# Patient Record
Sex: MALE | Race: WHITE | NOT HISPANIC OR LATINO | Employment: FULL TIME | URBAN - METROPOLITAN AREA
[De-identification: names, ages, dates, MRNs, and addresses within clinical notes are randomized per-mention and may not be internally consistent; named-entity substitution may affect disease eponyms.]

---

## 2020-08-25 ENCOUNTER — TELEPHONE (OUTPATIENT)
Dept: NEUROSURGERY | Facility: CLINIC | Age: 55
End: 2020-08-25

## 2020-08-25 NOTE — TELEPHONE ENCOUNTER
----- Message from Julita  sent at 8/25/2020 11:41 AM CDT -----  Regarding: appt access  Contact: pt  Type: Needs Medical Advice    Who Called:      Best Call Back Number:     Additional Information: Requesting a call back from Nurse, regarding access to a appt pt has a referral on file ,please call back with advice

## 2020-09-02 ENCOUNTER — OFFICE VISIT (OUTPATIENT)
Dept: NEUROSURGERY | Facility: CLINIC | Age: 55
End: 2020-09-02
Payer: COMMERCIAL

## 2020-09-02 VITALS
HEIGHT: 72 IN | BODY MASS INDEX: 26.28 KG/M2 | SYSTOLIC BLOOD PRESSURE: 139 MMHG | HEART RATE: 99 BPM | DIASTOLIC BLOOD PRESSURE: 83 MMHG | WEIGHT: 194 LBS

## 2020-09-02 DIAGNOSIS — M54.16 LUMBAR RADICULOPATHY, RIGHT: ICD-10-CM

## 2020-09-02 DIAGNOSIS — M47.816 LUMBAR SPONDYLOSIS: Primary | ICD-10-CM

## 2020-09-02 DIAGNOSIS — G95.9 LUMBAR MYELOPATHY: ICD-10-CM

## 2020-09-02 PROCEDURE — 99999 PR PBB SHADOW E&M-EST. PATIENT-LVL III: CPT | Mod: PBBFAC,,, | Performed by: STUDENT IN AN ORGANIZED HEALTH CARE EDUCATION/TRAINING PROGRAM

## 2020-09-02 PROCEDURE — 99244 OFF/OP CNSLTJ NEW/EST MOD 40: CPT | Mod: S$GLB,,, | Performed by: STUDENT IN AN ORGANIZED HEALTH CARE EDUCATION/TRAINING PROGRAM

## 2020-09-02 PROCEDURE — 99244 PR OFFICE CONSULTATION,LEVEL IV: ICD-10-PCS | Mod: S$GLB,,, | Performed by: STUDENT IN AN ORGANIZED HEALTH CARE EDUCATION/TRAINING PROGRAM

## 2020-09-02 PROCEDURE — 99999 PR PBB SHADOW E&M-EST. PATIENT-LVL III: ICD-10-PCS | Mod: PBBFAC,,, | Performed by: STUDENT IN AN ORGANIZED HEALTH CARE EDUCATION/TRAINING PROGRAM

## 2020-09-02 NOTE — PROGRESS NOTES
[facility] Neurosurgery  Clinic Consult     Consult Requested By: CECILIA Mcallister Jr, MD, CECILIA Mcallister Jr., MD        SUBJECTIVE:     Chief Complaint:   Chief Complaint   Patient presents with    Lumbar Spine Pain (L-Spine)     Patient reports low back pain that radiates into the right leg; numbness and tingling present; pain 4/10       History of Present Illness:  Bry Gloria Jr. is a 54 y.o. male who presents with 8 months of right buttock and lower extremity pain  He is stiffness.  Historical back pain flare ups which he is managed conservatively  He has worked with a chiropractor up to 4 days a week  Back pain is not bother now.  It is right he describes as buttock and leg pain  No motor deficit no red flags    S a bother her more when he is resting are attempting sleep  He has tried gabapentin but base and drowsy    Review of patient's allergies indicates:   Allergen Reactions    Crestor [rosuvastatin] Other (See Comments)    Penicillins Rash       Past Medical History:   Diagnosis Date    Hyperlipidemia      Past Surgical History:   Procedure Laterality Date    CHOLECYSTECTOMY      3-0 years ago    COLONOSCOPY      WISDOM TOOTH EXTRACTION       Family History   Problem Relation Age of Onset    Cancer Father      Social History     Tobacco Use    Smoking status: Former Smoker    Smokeless tobacco: Never Used   Substance Use Topics    Alcohol use: Yes    Drug use: Never        Review of Systems:      Constitutional: no fever, chills or night sweats. No changes in weight   Eyes: no diplopia, lid drooping, loss of vision   ENT: no nasal congestion, sore throat, discharge  Respiratory: no cough, shortness of breath, or pain  Cardiovascular: no chest pain or palpitations   Gastrointestinal: no nausea or vomiting  Genitourinary: no hematuria or dysuria   Integument/Breast: no rash or pruritis   Hematologic/Lymphatic: no easy bruising or lymphadenopathy   Musculoskeletal: no arthralgias or myalgias.    Neurological: no seizures or tremors   Behavioral/Psych: no auditory or visual hallucinations   Endocrine: no heat or cold intolerance   All other systems reviewed and are negative.      OBJECTIVE:     Vital Signs (Most Recent):  Pulse: 99 (09/02/20 1342)  BP: 139/83 (09/02/20 1342)    Physical Exam:      General: well developed, well nourished, no distress. .  Mental Status: Awake, Alert, Oriented x 4  Language: No aphasia  Speech: No dysarthria  Head: normocephalic, atraumatic.  Neck: trachea midline, no JVD   Cardiovascular: no LE edema  Pulmonary: normal respirations, no signs of respiratory distress  Abdomen: soft, non-distended  Skin: Skin is warm, dry and intact.    Motor Strength:  No abnormal movements seen.     Strength  Deltoids Triceps Biceps Wrist Extension Wrist Flexion Hand  Interossei     Upper: R 5/5 5/5 5/5 5/5 5/5 5/5 5/5      L 5/5 5/5 5/5 5/5 5/5 5/5 5/5       Iliopsoas Quadriceps Knee  Flexion Tibialis  anterior Gastro- cnemius EHL  Foot Eversion Foot inversion   Lower: R 5/5 5/5 5/5 5/5 5/5 5/5 5/5 5/5 5/5    L 5/5 5/5 5/5 5/5 5/5 5/5 5/5 5/5 5/5     SILT,PP      DTR's: 2 + and symmetric in UE and LE  Gambino: absent  Clonus: absent  Babinski: absent    Gait: normal    Tandem Gait: No difficulty           Able to walk on heels & toes       Pain on Hip ROM: Negative  Straight leg raise: negative bilaterally ,,, pain , tight behind right knee    SI Joint tenderness: Negative bilaterally   BART: Negative bilaterally    Pain tenderness palpation the right gluteal region    Diagnostic Results:  I have independently reviewed the following imaging:  MRI: Reviewed  MRI from 8, 2020,    Mild spondylosis, there is no fracture there is no disc herniation, there is no deformity  He has essentially multilevel mild lumbar spondylosis with degenerative disc disease small disc osteophyte complex  At L4-5 5 1 he has mild moderate facet arthropathy with lateral recess and proximal foraminal stenosis no  obvious mass effect on the neural element      ASSESSMENT/PLAN:     Lumbar spondylosis    Lumbar myelopathy  -     Ambulatory referral/consult to Neurosurgery    Lumbar radiculopathy, right  Comments:  L4-5 SANJEEV diagnostic, therapeutic  Healthy back PT eval/ modalities    will reassess, possible EMG/NCS      Right leg pain, uncertain etiology  He has MRI of the lumbar spine with pretty typical lumbar spondylosis without overt neural impingement correlating with his clinical symptoms  Clinically this would likely correlating with L4-5 or L5-S1   Do taking more likely has a radiculitis/radiculopathy with a significant inflammatory component    Secondary consideration include right gluteal myofascial injury or hip pathology, his right knee pain    Will start further workup of radiculopathy/radiculitis see how he responds and treat conservatively        Ant Simon MD  Neurosurgery

## 2020-09-02 NOTE — LETTER
September 2, 2020      CECILIA Mcallister Jr., MD  [address] [facility]  [address]  [facility] [facility] [address]           [place] - Neurosurgery  [address] [place] [address]  Phone: [phone]  Fax: [phone]          Patient: Bry Gloria Jr.   MR Number: [id number]   YOB: 1965   Date of Visit: 9/2/2020       Dear Dr. CECILIA Mcallister Jr.:    Thank you for referring Bry Gloria to me for evaluation. Attached you will find relevant portions of my assessment and plan of care.    If you have questions, please do not hesitate to call me. I look forward to following Bry Gloria along with you.    Sincerely,    Ant Simon MD    Enclosure  CC:  No Recipients    If you would like to receive this communication electronically, please contact [email or web address] or [phone] to request more information on [employer] Link access.    For providers and/or their staff who would like to refer a patient to [facility], please contact us through our one-stop-shop provider referral line, [facility], at [phone].    If you feel you have received this communication in error or would no longer like to receive these types of communications, please e-mail [email or web address]

## 2020-09-10 ENCOUNTER — TELEPHONE (OUTPATIENT)
Dept: PAIN MEDICINE | Facility: CLINIC | Age: 55
End: 2020-09-10

## 2020-09-11 ENCOUNTER — OFFICE VISIT (OUTPATIENT)
Dept: PAIN MEDICINE | Facility: CLINIC | Age: 55
End: 2020-09-11
Payer: COMMERCIAL

## 2020-09-11 VITALS
HEART RATE: 80 BPM | SYSTOLIC BLOOD PRESSURE: 122 MMHG | HEIGHT: 72 IN | DIASTOLIC BLOOD PRESSURE: 86 MMHG | BODY MASS INDEX: 26.18 KG/M2 | WEIGHT: 193.25 LBS

## 2020-09-11 DIAGNOSIS — M47.816 LUMBAR SPONDYLOSIS: ICD-10-CM

## 2020-09-11 DIAGNOSIS — M54.16 LUMBAR RADICULOPATHY: Primary | ICD-10-CM

## 2020-09-11 PROCEDURE — 99244 PR OFFICE CONSULTATION,LEVEL IV: ICD-10-PCS | Mod: S$GLB,,, | Performed by: PHYSICAL MEDICINE & REHABILITATION

## 2020-09-11 PROCEDURE — 99999 PR PBB SHADOW E&M-EST. PATIENT-LVL IV: CPT | Mod: PBBFAC,,, | Performed by: PHYSICAL MEDICINE & REHABILITATION

## 2020-09-11 PROCEDURE — 99999 PR PBB SHADOW E&M-EST. PATIENT-LVL IV: ICD-10-PCS | Mod: PBBFAC,,, | Performed by: PHYSICAL MEDICINE & REHABILITATION

## 2020-09-11 PROCEDURE — 99244 OFF/OP CNSLTJ NEW/EST MOD 40: CPT | Mod: S$GLB,,, | Performed by: PHYSICAL MEDICINE & REHABILITATION

## 2020-09-11 NOTE — LETTER
September 11, 2020      Ant Simon MD  [address] [facility] [address] [place] [address]           [facility] Interventional Pain Medicine  [address] [facility] [address]  Phone: [phone]  Fax: [phone]          Patient: Bry Gloria Jr.   MR Number: [id number]   YOB: 1965   Date of Visit: 9/11/2020       Dear Dr. Ant Simon:    Thank you for referring Bry Gloria to me for evaluation. Attached you will find relevant portions of my assessment and plan of care.    If you have questions, please do not hesitate to call me. I look forward to following Bry Gloria along with you.    Sincerely,    Amrit Moreno MD    Enclosure  CC:  No Recipients    If you would like to receive this communication electronically, please contact [email or web address] or [phone] to request more information on [employer] Link access.    For providers and/or their staff who would like to refer a patient to [facility], please contact us through our one-stop-shop provider referral line, [facility], at [phone].    If you feel you have received this communication in error or would no longer like to receive these types of communications, please e-mail [email or web address]

## 2020-09-11 NOTE — H&P (VIEW-ONLY)
New Patient Chronic Pain Note (Initial Visit)    Referring Physician: Ant Simon MD    PCP: CECILIA Mcallister Jr, MD    Chief Complaint:   Chief Complaint   Patient presents with    Hip Pain    Leg Pain        SUBJECTIVE:    Bry Gloria Jr. is a 54 y.o. male who presents to the clinic for the evaluation of lower back pain.  He was referred by the neurosurgery department for further evaluation and management of this pain.  Neurosurgery is requesting a L4-5 SANJEEV for diagnostic and therapeutic purposes.  Of note, patient has past medical history of hyperlipidemia and hypertension.  The pain started 8-9 months ago without any specific injury or trauma and symptoms have been worsening.The pain is located in the lower lumbar area and radiates to the right lower extremity mostly over the lateral hip with extension posteriorly to the calf.  The pain is described as Stabbing, numbness and is rated as 3/10. The pain is rated with a score of  3/10 on the BEST day and a score of 9/10 on the WORST day.  Symptoms interfere with daily activity, sleeping and work. The pain is exacerbated by sitting down, rest, lying down.  The pain is mitigated by light movement. The patient reports spending less than 2 hours per day reclining. The patient reports 5-7 hours of uninterrupted sleep per night.  He owns and operates a plumbing company.    Patient denies night fever/night sweats, urinary incontinence, bowel incontinence, significant weight loss, significant motor weakness and loss of sensations.    Pain Disability Index Review:   No flowsheet data found.    Non-Pharmacologic Treatments:  Physical Therapy/Home Exercise: yes  Ice/Heat:yes  TENS: no  Acupuncture: no  Massage: yes  Chiropractic: yes    Other: no      Pain Medications:  - Opioids: None  - Adjuvant Medications: Medrol Dosepak, Aleve  - Anti-Coagulants: Aspirin     report:  Reviewed and consistent with medication use as prescribed.  No controlled substances recently  prescribed.    Pain Procedures:   Denies      Imaging:   MRI lumbar spine 08/27/2020:      Past Medical History:   Diagnosis Date    Hyperlipidemia      Past Surgical History:   Procedure Laterality Date    CHOLECYSTECTOMY      3-0 years ago    COLONOSCOPY      WISDOM TOOTH EXTRACTION       Social History     Socioeconomic History    Marital status:      Spouse name: Shante    Number of children: 2    Years of education: Not on file    Highest education level: Not on file   Occupational History    Not on file   Social Needs    Financial resource strain: Not on file    Food insecurity     Worry: Not on file     Inability: Not on file    Transportation needs     Medical: Not on file     Non-medical: Not on file   Tobacco Use    Smoking status: Former Smoker    Smokeless tobacco: Never Used   Substance and Sexual Activity    Alcohol use: Yes    Drug use: Never    Sexual activity: Yes   Lifestyle    Physical activity     Days per week: Not on file     Minutes per session: Not on file    Stress: Not at all   Relationships    Social connections     Talks on phone: Not on file     Gets together: Not on file     Attends Orthodoxy service: Not on file     Active member of club or organization: Not on file     Attends meetings of clubs or organizations: Not on file     Relationship status: Not on file   Other Topics Concern    Not on file   Social History Narrative    Not on file     Family History   Problem Relation Age of Onset    Cancer Father        Review of patient's allergies indicates:   Allergen Reactions    Crestor [rosuvastatin] Other (See Comments)    Penicillins Rash       Current Outpatient Medications   Medication Sig    aspirin (ECOTRIN) 81 MG EC tablet Take 81 mg by mouth once daily.    ezetimibe (ZETIA) 10 mg tablet Take 10 mg by mouth once daily.    rosuvastatin (CRESTOR) 5 MG tablet Take 1 tablet (5 mg total) by mouth every other day.    valsartan (DIOVAN) 80 MG tablet  Take 80 mg by mouth once daily.     No current facility-administered medications for this visit.        Review of Systems     GENERAL:  No weight loss, malaise or fevers.  HEENT:   No recent changes in vision or hearing  NECK:  Negative for lumps, no difficulty with swallowing.  RESPIRATORY:  Negative for cough, wheezing or shortness of breath, patient denies any recent URI.  CARDIOVASCULAR:  Negative for chest pain, leg swelling or palpitations.  GI:  Negative for abdominal discomfort, blood in stools or black stools or change in bowel habits.  MUSCULOSKELETAL:  See HPI.  SKIN:  Negative for lesions, rash, and itching.  PSYCH:  No mood disorder or recent psychosocial stressors.  Patients sleep is not disturbed secondary to pain.  HEMATOLOGY/LYMPHOLOGY:  Negative for prolonged bleeding, bruising easily or swollen nodes.  Patient is currently taking anti-coagulants - ASA  NEURO:   No history of headaches, syncope, paralysis, seizures or tremors.  All other reviewed and negative other than HPI.    OBJECTIVE:    /86   Pulse 80   Ht 6' (1.829 m)   Wt 87.6 kg (193 lb 3.7 oz)   BMI 26.21 kg/m²         Physical Exam    GENERAL: Well appearing, in no acute distress, alert and oriented x3.  PSYCH:  Mood and affect appropriate.  SKIN: Skin color, texture, turgor normal, no rashes or lesions.  HEAD/FACE:  Normocephalic, atraumatic. Cranial nerves grossly intact.  CV: RRR with palpation of the radial artery.  PULM: No evidence of respiratory difficulty, symmetric chest rise.  GI:  Soft and non-tender.  BACK: Straight leg raising in the sitting and supine positions is positive to radicular pain on the right.  Minimal pain to palpation over the facet joints of the lumbar spine and lumbar paraspinals.  Fair range of motion without pain reproduction.  EXTREMITIES: Peripheral joint ROM is full and pain free without obvious instability or laxity in all four extremities. No deformities, edema, or skin discoloration. Good  capillary refill.  MUSCULOSKELETAL:  Hip and knee provocative maneuvers are negative.  There is no pain with palpation over the sacroiliac joints bilaterally.  FABERs test is negative.  FADIRs test is negative.   Bilateral upper and lower extremity strength is normal and symmetric.  No atrophy or tone abnormalities are noted.  NEURO: Bilateral upper and lower extremity coordination and muscle stretch reflexes are physiologic and symmetric.  Plantar response are downgoing. No clonus.  No loss of sensation is noted.  GAIT: normal.      LABS:  Lab Results   Component Value Date    WBC CANCELED 08/25/2020    HGB CANCELED 08/25/2020    HCT CANCELED 08/25/2020    PLT CANCELED 08/25/2020       CMP  Sodium   Date Value Ref Range Status   08/25/2020 141 134 - 144 mmol/L Final     Potassium   Date Value Ref Range Status   08/25/2020 4.4 3.5 - 5.2 mmol/L Final     Chloride   Date Value Ref Range Status   08/25/2020 101 96 - 106 mmol/L Final     CO2   Date Value Ref Range Status   08/25/2020 23 20 - 29 mmol/L Final     Glucose   Date Value Ref Range Status   08/25/2020 82 65 - 99 mg/dL Final     BUN, Bld   Date Value Ref Range Status   08/25/2020 17 6 - 24 mg/dL Final     Creatinine   Date Value Ref Range Status   08/25/2020 1.11 0.76 - 1.27 mg/dL Final     Calcium   Date Value Ref Range Status   08/25/2020 9.3 8.7 - 10.2 mg/dL Final     Albumin   Date Value Ref Range Status   08/25/2020 4.4 3.8 - 4.9 g/dL Final     Total Bilirubin   Date Value Ref Range Status   08/25/2020 0.7 0.0 - 1.2 mg/dL Final     AST   Date Value Ref Range Status   08/25/2020 22 0 - 40 IU/L Final     ALT   Date Value Ref Range Status   08/25/2020 15 0 - 44 IU/L Final     eGFR if non    Date Value Ref Range Status   08/25/2020 74 >59 mL/min/1.73 Final       No results found for: LABA1C, HGBA1C          ASSESSMENT: 54 y.o. year old male with lower back and right leg pain, consistent with     1. Lumbar radiculopathy  IR Epidural  Transforaminal Inj 1st Vert Lumbar Uni    IR Epidural Transfor Inj Ea Add Lumb Uni    Case Request-RAD/Other Procedure Area: right L4-5+ L5-S1 TF SANJEEV with local   2. Lumbar spondylosis  Ambulatory referral/consult to Pain Clinic         PLAN:   - Interventions: Scheduled for right-sided L4-5 and L5-S1 transforaminal epidural steroid injection for diagnostic and therapeutic purposes.. Explained the risks and benefits of the procedure in detail with the patient today in clinic along with alternative treatment options, and the patient elected to pursue the intervention at this time.      - Anticoagulation use: yes aspirin    - Medications: I have stressed the importance of physical activity and a home exercise plan to help with pain and improve health. and Patient can continue with medications for now since they are providing benefits, using them appropriately, and without side effects.     - Therapy:  Advised patient continue with activities and exercises as tolerated    - Labs:  Reviewed    - Imaging: Reviewed available imaging with patient and answered any questions they had regarding study.    - Consults/Referrals:  None at this time    - Records:  Reviewed/Obtain old records from outside physicians and imaging    - Follow up visit: return to clinic 4 weeks post procedure    - Counseled patient regarding the importance of activity modification and physical therapy    - This condition does not require this patient to take time off of work, and the primary goal of our Pain Management services is to improve the patient's functional capacity.    - Patient Questions: Answered all of the patient's questions regarding diagnosis, therapy, and treatment        The above plan and management options were discussed at length with patient. Patient is in agreement with the above and verbalized understanding.    I discussed the goals of interventional chronic pain management with the patient on today's visit.  I explained the  utility of injections for diagnostic and therapeutic purposes.  We discussed a multimodal approach to pain including treating the patient's given worst pain at any given time.  We will use a systematic approach to addressing pain.  We will also adopt a multimodal approach that includes injections, adjuvant medications, physical therapy, at times psychiatry.  There may be a limited role for opioid use intermittently in the treatment of pain, more particularly for acute pain although no one approach can be used as a sole treatment modality.    I emphasized the importance of regular exercise, core strengthening and stretching, diet and weight loss as a cornerstone of long-term pain management.      Amrit Moreno MD  Interventional Pain Management  [facility]    Disclaimer:  This note was prepared using voice recognition system and is likely to have sound alike errors that may have been overlooked even after proof reading.  Please call me with any questions

## 2020-09-11 NOTE — PROGRESS NOTES
New Patient Chronic Pain Note (Initial Visit)    Referring Physician: Ant Simon MD    PCP: CECILIA Mcallister Jr, MD    Chief Complaint:   Chief Complaint   Patient presents with    Hip Pain    Leg Pain        SUBJECTIVE:    Bry Gloria Jr. is a 54 y.o. male who presents to the clinic for the evaluation of lower back pain.  He was referred by the neurosurgery department for further evaluation and management of this pain.  Neurosurgery is requesting a L4-5 SANJEEV for diagnostic and therapeutic purposes.  Of note, patient has past medical history of hyperlipidemia and hypertension.  The pain started 8-9 months ago without any specific injury or trauma and symptoms have been worsening.The pain is located in the lower lumbar area and radiates to the right lower extremity mostly over the lateral hip with extension posteriorly to the calf.  The pain is described as Stabbing, numbness and is rated as 3/10. The pain is rated with a score of  3/10 on the BEST day and a score of 9/10 on the WORST day.  Symptoms interfere with daily activity, sleeping and work. The pain is exacerbated by sitting down, rest, lying down.  The pain is mitigated by light movement. The patient reports spending less than 2 hours per day reclining. The patient reports 5-7 hours of uninterrupted sleep per night.  He owns and operates a plumbing company.    Patient denies night fever/night sweats, urinary incontinence, bowel incontinence, significant weight loss, significant motor weakness and loss of sensations.    Pain Disability Index Review:   No flowsheet data found.    Non-Pharmacologic Treatments:  Physical Therapy/Home Exercise: yes  Ice/Heat:yes  TENS: no  Acupuncture: no  Massage: yes  Chiropractic: yes    Other: no      Pain Medications:  - Opioids: None  - Adjuvant Medications: Medrol Dosepak, Aleve  - Anti-Coagulants: Aspirin     report:  Reviewed and consistent with medication use as prescribed.  No controlled substances recently  prescribed.    Pain Procedures:   Denies      Imaging:   MRI lumbar spine 08/27/2020:      Past Medical History:   Diagnosis Date    Hyperlipidemia      Past Surgical History:   Procedure Laterality Date    CHOLECYSTECTOMY      3-0 years ago    COLONOSCOPY      WISDOM TOOTH EXTRACTION       Social History     Socioeconomic History    Marital status:      Spouse name: Shante    Number of children: 2    Years of education: Not on file    Highest education level: Not on file   Occupational History    Not on file   Social Needs    Financial resource strain: Not on file    Food insecurity     Worry: Not on file     Inability: Not on file    Transportation needs     Medical: Not on file     Non-medical: Not on file   Tobacco Use    Smoking status: Former Smoker    Smokeless tobacco: Never Used   Substance and Sexual Activity    Alcohol use: Yes    Drug use: Never    Sexual activity: Yes   Lifestyle    Physical activity     Days per week: Not on file     Minutes per session: Not on file    Stress: Not at all   Relationships    Social connections     Talks on phone: Not on file     Gets together: Not on file     Attends Restorationism service: Not on file     Active member of club or organization: Not on file     Attends meetings of clubs or organizations: Not on file     Relationship status: Not on file   Other Topics Concern    Not on file   Social History Narrative    Not on file     Family History   Problem Relation Age of Onset    Cancer Father        Review of patient's allergies indicates:   Allergen Reactions    Crestor [rosuvastatin] Other (See Comments)    Penicillins Rash       Current Outpatient Medications   Medication Sig    aspirin (ECOTRIN) 81 MG EC tablet Take 81 mg by mouth once daily.    ezetimibe (ZETIA) 10 mg tablet Take 10 mg by mouth once daily.    rosuvastatin (CRESTOR) 5 MG tablet Take 1 tablet (5 mg total) by mouth every other day.    valsartan (DIOVAN) 80 MG tablet  Take 80 mg by mouth once daily.     No current facility-administered medications for this visit.        Review of Systems     GENERAL:  No weight loss, malaise or fevers.  HEENT:   No recent changes in vision or hearing  NECK:  Negative for lumps, no difficulty with swallowing.  RESPIRATORY:  Negative for cough, wheezing or shortness of breath, patient denies any recent URI.  CARDIOVASCULAR:  Negative for chest pain, leg swelling or palpitations.  GI:  Negative for abdominal discomfort, blood in stools or black stools or change in bowel habits.  MUSCULOSKELETAL:  See HPI.  SKIN:  Negative for lesions, rash, and itching.  PSYCH:  No mood disorder or recent psychosocial stressors.  Patients sleep is not disturbed secondary to pain.  HEMATOLOGY/LYMPHOLOGY:  Negative for prolonged bleeding, bruising easily or swollen nodes.  Patient is currently taking anti-coagulants - ASA  NEURO:   No history of headaches, syncope, paralysis, seizures or tremors.  All other reviewed and negative other than HPI.    OBJECTIVE:    /86   Pulse 80   Ht 6' (1.829 m)   Wt 87.6 kg (193 lb 3.7 oz)   BMI 26.21 kg/m²         Physical Exam    GENERAL: Well appearing, in no acute distress, alert and oriented x3.  PSYCH:  Mood and affect appropriate.  SKIN: Skin color, texture, turgor normal, no rashes or lesions.  HEAD/FACE:  Normocephalic, atraumatic. Cranial nerves grossly intact.  CV: RRR with palpation of the radial artery.  PULM: No evidence of respiratory difficulty, symmetric chest rise.  GI:  Soft and non-tender.  BACK: Straight leg raising in the sitting and supine positions is positive to radicular pain on the right.  Minimal pain to palpation over the facet joints of the lumbar spine and lumbar paraspinals.  Fair range of motion without pain reproduction.  EXTREMITIES: Peripheral joint ROM is full and pain free without obvious instability or laxity in all four extremities. No deformities, edema, or skin discoloration. Good  capillary refill.  MUSCULOSKELETAL:  Hip and knee provocative maneuvers are negative.  There is no pain with palpation over the sacroiliac joints bilaterally.  FABERs test is negative.  FADIRs test is negative.   Bilateral upper and lower extremity strength is normal and symmetric.  No atrophy or tone abnormalities are noted.  NEURO: Bilateral upper and lower extremity coordination and muscle stretch reflexes are physiologic and symmetric.  Plantar response are downgoing. No clonus.  No loss of sensation is noted.  GAIT: normal.      LABS:  Lab Results   Component Value Date    WBC CANCELED 08/25/2020    HGB CANCELED 08/25/2020    HCT CANCELED 08/25/2020    PLT CANCELED 08/25/2020       CMP  Sodium   Date Value Ref Range Status   08/25/2020 141 134 - 144 mmol/L Final     Potassium   Date Value Ref Range Status   08/25/2020 4.4 3.5 - 5.2 mmol/L Final     Chloride   Date Value Ref Range Status   08/25/2020 101 96 - 106 mmol/L Final     CO2   Date Value Ref Range Status   08/25/2020 23 20 - 29 mmol/L Final     Glucose   Date Value Ref Range Status   08/25/2020 82 65 - 99 mg/dL Final     BUN, Bld   Date Value Ref Range Status   08/25/2020 17 6 - 24 mg/dL Final     Creatinine   Date Value Ref Range Status   08/25/2020 1.11 0.76 - 1.27 mg/dL Final     Calcium   Date Value Ref Range Status   08/25/2020 9.3 8.7 - 10.2 mg/dL Final     Albumin   Date Value Ref Range Status   08/25/2020 4.4 3.8 - 4.9 g/dL Final     Total Bilirubin   Date Value Ref Range Status   08/25/2020 0.7 0.0 - 1.2 mg/dL Final     AST   Date Value Ref Range Status   08/25/2020 22 0 - 40 IU/L Final     ALT   Date Value Ref Range Status   08/25/2020 15 0 - 44 IU/L Final     eGFR if non    Date Value Ref Range Status   08/25/2020 74 >59 mL/min/1.73 Final       No results found for: LABA1C, HGBA1C          ASSESSMENT: 54 y.o. year old male with lower back and right leg pain, consistent with     1. Lumbar radiculopathy  IR Epidural  Transforaminal Inj 1st Vert Lumbar Uni    IR Epidural Transfor Inj Ea Add Lumb Uni    Case Request-RAD/Other Procedure Area: right L4-5+ L5-S1 TF SANJEEV with local   2. Lumbar spondylosis  Ambulatory referral/consult to Pain Clinic         PLAN:   - Interventions: Scheduled for right-sided L4-5 and L5-S1 transforaminal epidural steroid injection for diagnostic and therapeutic purposes.. Explained the risks and benefits of the procedure in detail with the patient today in clinic along with alternative treatment options, and the patient elected to pursue the intervention at this time.      - Anticoagulation use: yes aspirin    - Medications: I have stressed the importance of physical activity and a home exercise plan to help with pain and improve health. and Patient can continue with medications for now since they are providing benefits, using them appropriately, and without side effects.     - Therapy:  Advised patient continue with activities and exercises as tolerated    - Labs:  Reviewed    - Imaging: Reviewed available imaging with patient and answered any questions they had regarding study.    - Consults/Referrals:  None at this time    - Records:  Reviewed/Obtain old records from outside physicians and imaging    - Follow up visit: return to clinic 4 weeks post procedure    - Counseled patient regarding the importance of activity modification and physical therapy    - This condition does not require this patient to take time off of work, and the primary goal of our Pain Management services is to improve the patient's functional capacity.    - Patient Questions: Answered all of the patient's questions regarding diagnosis, therapy, and treatment        The above plan and management options were discussed at length with patient. Patient is in agreement with the above and verbalized understanding.    I discussed the goals of interventional chronic pain management with the patient on today's visit.  I explained the  utility of injections for diagnostic and therapeutic purposes.  We discussed a multimodal approach to pain including treating the patient's given worst pain at any given time.  We will use a systematic approach to addressing pain.  We will also adopt a multimodal approach that includes injections, adjuvant medications, physical therapy, at times psychiatry.  There may be a limited role for opioid use intermittently in the treatment of pain, more particularly for acute pain although no one approach can be used as a sole treatment modality.    I emphasized the importance of regular exercise, core strengthening and stretching, diet and weight loss as a cornerstone of long-term pain management.      Amrit Moreno MD  Interventional Pain Management  [facility]    Disclaimer:  This note was prepared using voice recognition system and is likely to have sound alike errors that may have been overlooked even after proof reading.  Please call me with any questions

## 2020-09-16 ENCOUNTER — TELEPHONE (OUTPATIENT)
Dept: PAIN MEDICINE | Facility: CLINIC | Age: 55
End: 2020-09-16

## 2020-09-16 NOTE — TELEPHONE ENCOUNTER
Contacted pt. Appt for procedure scheduled 09/29/2020 with . Went over instructions with pt and pt verbalized understanding.Instructions also mailed to pt.  All questions answered.

## 2020-09-16 NOTE — TELEPHONE ENCOUNTER
Tried to call to inform I am waiting on asa clearance from . No answer. Left   Alexis Boyd, MA [facility]   [facility]

## 2020-09-16 NOTE — TELEPHONE ENCOUNTER
Tried to call to set up procedure . No answer. Left   Farzad Spangler, MA [facility]   [facility]

## 2020-09-16 NOTE — TELEPHONE ENCOUNTER
----- Message from Ana Rodriguez sent at 9/16/2020  8:41 AM CDT -----  Pt stated he has been waiting on a call regarding scheduling an injection appt. Please call back at [phone]

## 2020-09-16 NOTE — TELEPHONE ENCOUNTER
Good morning. There is no medical contraindication to him stopping ASA for the required time interval.   May proceed.

## 2020-09-16 NOTE — TELEPHONE ENCOUNTER
Good morning,  Dr. Moreno would like to perform a Lumbar TF SANJEEV , but patient is on aspirin and would need to hold all asa products 7 days prior, along with any other blood thinners. Please advise.

## 2020-09-18 ENCOUNTER — TELEPHONE (OUTPATIENT)
Dept: PAIN MEDICINE | Facility: CLINIC | Age: 55
End: 2020-09-18

## 2020-09-18 NOTE — PRE-PROCEDURE INSTRUCTIONS
Attempted to PAT patient for procedure on 9/23 with Dr. Moreno . No answer. M with return phone number. No return call at this time.

## 2020-09-18 NOTE — TELEPHONE ENCOUNTER
Moved from 09/29 to 09/24. Pt informed to STOP plavix, aspirin,ibuprofen,motrin,excedrin,aleve,advil,meloxicam,all vitamins, and supplements 5-7 days prior to procedure, so starting tomorrow. Pt understood. All questions answered.   Farzad Spangler MA  [facility]

## 2020-09-21 NOTE — PRE-PROCEDURE INSTRUCTIONS
Spoke with patient regarding procedure scheduled on 9/24    Arrival time 0650    Has patient been sick with fever or on antibiotics within the last 7 days? No    Has patient received a vaccination within the last 7 days? no    Has the patient stopped all medications as directed? ASA on 9/18. Hold vitamins, supplements and other NSAIDS.    Does patient have a pacemaker and or defibrillator? no    Does the patient have a ride to and from procedure and someone reliable to remain with patient? Wife Shante [phone]    Is the patient diabetic? no    Does the patient have sleep apnea? Or use O2 at home? No and no     Is the patient receiving sedation? yes    Is the patient instructed to remain NPO beginning at midnight the night before their procedure? yes    Procedure location confirmed with patient? Yes    Covid- Denies signs/symptoms. Instructed to notify PAT/MD if any changes.

## 2020-09-21 NOTE — PRE-PROCEDURE INSTRUCTIONS
Attempted to PAT patient for procedure on 9/24 with Dr. Moreno . No answer. M with return phone number. No return call at this time.

## 2020-09-24 ENCOUNTER — HOSPITAL ENCOUNTER (OUTPATIENT)
Facility: HOSPITAL | Age: 55
Discharge: HOME | End: 2020-09-24
Attending: PHYSICAL MEDICINE & REHABILITATION | Admitting: PHYSICAL MEDICINE & REHABILITATION
Payer: COMMERCIAL

## 2020-09-24 VITALS
DIASTOLIC BLOOD PRESSURE: 90 MMHG | HEART RATE: 81 BPM | HEIGHT: 72 IN | OXYGEN SATURATION: 98 % | RESPIRATION RATE: 16 BRPM | BODY MASS INDEX: 26.51 KG/M2 | WEIGHT: 195.75 LBS | SYSTOLIC BLOOD PRESSURE: 134 MMHG | TEMPERATURE: 98 F

## 2020-09-24 DIAGNOSIS — M54.16 LUMBAR RADICULOPATHY: Primary | ICD-10-CM

## 2020-09-24 PROCEDURE — 64484 NJX AA&/STRD TFRM EPI L/S EA: CPT | Mod: RT,,, | Performed by: PHYSICAL MEDICINE & REHABILITATION

## 2020-09-24 PROCEDURE — 25500020 PHARM REV CODE 255: Performed by: PHYSICAL MEDICINE & REHABILITATION

## 2020-09-24 PROCEDURE — 64483 NJX AA&/STRD TFRM EPI L/S 1: CPT | Performed by: PHYSICAL MEDICINE & REHABILITATION

## 2020-09-24 PROCEDURE — 25000003 PHARM REV CODE 250: Performed by: PHYSICAL MEDICINE & REHABILITATION

## 2020-09-24 PROCEDURE — 64484 PRA INJECT ANES/STEROID FORAMEN LUMBAR/SACRAL W IMG GUIDE ,EA ADD LEVEL: ICD-10-PCS | Mod: RT,,, | Performed by: PHYSICAL MEDICINE & REHABILITATION

## 2020-09-24 PROCEDURE — 64483 PR EPIDURAL INJ, ANES/STEROID, TRANSFORAMINAL, LUMB/SACR, SNGL LEVL: ICD-10-PCS | Mod: RT,,, | Performed by: PHYSICAL MEDICINE & REHABILITATION

## 2020-09-24 PROCEDURE — 63600175 PHARM REV CODE 636 W HCPCS: Performed by: PHYSICAL MEDICINE & REHABILITATION

## 2020-09-24 PROCEDURE — 64484 NJX AA&/STRD TFRM EPI L/S EA: CPT | Performed by: PHYSICAL MEDICINE & REHABILITATION

## 2020-09-24 PROCEDURE — 64483 NJX AA&/STRD TFRM EPI L/S 1: CPT | Mod: RT,,, | Performed by: PHYSICAL MEDICINE & REHABILITATION

## 2020-09-24 RX ORDER — ONDANSETRON 2 MG/ML
4 INJECTION INTRAMUSCULAR; INTRAVENOUS ONCE AS NEEDED
Status: DISCONTINUED | OUTPATIENT
Start: 2020-09-24 | End: 2020-09-24 | Stop reason: HOSPADM

## 2020-09-24 RX ORDER — FENTANYL CITRATE 50 UG/ML
INJECTION, SOLUTION INTRAMUSCULAR; INTRAVENOUS
Status: DISCONTINUED | OUTPATIENT
Start: 2020-09-24 | End: 2020-09-24 | Stop reason: HOSPADM

## 2020-09-24 RX ORDER — MIDAZOLAM HYDROCHLORIDE 1 MG/ML
INJECTION, SOLUTION INTRAMUSCULAR; INTRAVENOUS
Status: DISCONTINUED | OUTPATIENT
Start: 2020-09-24 | End: 2020-09-24 | Stop reason: HOSPADM

## 2020-09-24 RX ORDER — METHYLPREDNISOLONE ACETATE 40 MG/ML
INJECTION, SUSPENSION INTRA-ARTICULAR; INTRALESIONAL; INTRAMUSCULAR; SOFT TISSUE
Status: DISCONTINUED | OUTPATIENT
Start: 2020-09-24 | End: 2020-09-24 | Stop reason: HOSPADM

## 2020-09-24 RX ORDER — BUPIVACAINE HYDROCHLORIDE 2.5 MG/ML
INJECTION, SOLUTION EPIDURAL; INFILTRATION; INTRACAUDAL
Status: DISCONTINUED | OUTPATIENT
Start: 2020-09-24 | End: 2020-09-24 | Stop reason: HOSPADM

## 2020-09-24 NOTE — DISCHARGE INSTRUCTIONS

## 2020-09-24 NOTE — OP NOTE
INFORMED CONSENT: The procedure, risks, benefits and options were discussed with patient. There are no contraindications to the procedure. The patient expressed understanding and agreed to proceed. The personnel performing the procedure was discussed.    09/24/2020    Surgeon: Amrit Moreno MD    Assistants: None    Sedation: Conscious sedation provided by M.D    The patient was monitored with continuous pulse oximetry, EKG, and intermittent blood pressure monitors.  The patient was hemodynamically stable throughout the entire process was responsive to voice, and breathing spontaneously.  Supplemental O2 was provided at 2L/min via nasal cannula.  Patient was comfortable for the duration of the procedure. (See nurse documentation and case log for sedation time)    There was a total of 2mg IV Midazolam and 25mcg Fentanyl titrated for the procedure      PROCEDURE:    1)  Right  L4-5 TRANSFORAMINAL EPIDURAL STEROID INJECTION    2)  Right  L5-S1 TRANSFORAMINAL EPIDURAL STEROID INJECTION    Pre Procedure diagnosis:    Right  L4 and L5  Lumbar radiculopathy [M54.16]    Post-Procedure diagnosis:   same    Complications: None    Specimens: None      DESCRIPTION OF PROCEDURE: The patient was brought to the procedure room. IV access was obtained prior to the procedure. The patient was positioned prone on the fluoroscopy table. Continuous hemodynamic monitoring was initiated including blood pressure, EKG, and pulse oximetry. . The skin was prepped with chlorhexidine and draped in a sterile fashion. Skin anesthesia was achieved using a total of 10mL of lidocaine, 5mL over each respective injection site.     The  L4-5 and L5-S1  transforaminal spaces were identified with fluoroscopy in the  AP, oblique, and lateral views.  A 22 gauge spinal quinke needle was then advanced into the area of the trans foraminal spaces at the above levels with confirmation of proper needle position using AP, oblique, and lateral fluoroscopic  views. Once the needle tip was in the area of the transforaminal space, and there was no blood, CSF or paraesthesias,  1 mL of Omnipaque 300mg/ml was injected on each side for a total of 2 mL.  Fluoroscopic imaging in the AP and lateral views revealed a clear outline of the spinal nerve with proximal spread of agent through the neural foramen into the epidural space. A total combination of 1 mL of Bupivicaine 0.25% and 40 mg depo medrol was injected into each level for a total of 4mL of injected medications with displacement of the contrast dye confirming that the medication went into the area of the transforaminal spaces at each level. A sterile dressing was applied.   Patient tolerated the procedure well.    Patient was taken back to the recovery room for further observation.     The patient was discharged to home in stable condition

## 2020-09-24 NOTE — DISCHARGE SUMMARY
Discharge Note  Short Stay      SUMMARY     Admit Date: 9/24/2020    Attending Physician: Amrit Moreno MD        Discharge Physician: Amrit Moreno MD        Discharge Date: 9/24/2020 8:04 AM    Procedure(s) (LRB):  right L4-5+ L5-S1 TF SANJEEV with local (Right)    Final Diagnosis: Lumbar radiculopathy [M54.16]    Disposition: Home or self care    Patient Instructions:   Current Discharge Medication List      CONTINUE these medications which have NOT CHANGED    Details   aspirin (ECOTRIN) 81 MG EC tablet Take 81 mg by mouth once daily.      ezetimibe (ZETIA) 10 mg tablet Take 10 mg by mouth once daily.      rosuvastatin (CRESTOR) 5 MG tablet Take 1 tablet (5 mg total) by mouth every other day.  Qty: 45 tablet, Refills: 3    Associated Diagnoses: Hypercholesterolemia      valsartan (DIOVAN) 80 MG tablet Take 80 mg by mouth once daily.    Comments: .                 Discharge Diagnosis: Lumbar radiculopathy [M54.16]  Condition on Discharge: Stable with no complications to procedure   Diet on Discharge: Same as before.  Activity: as per instruction sheet.  Discharge to: Home with a responsible adult.  Follow up: 2-4 weeks       Please call the office if you experience any weakness or loss of sensation, fever > 101.5, pain uncontrolled with oral medications, persistent nausea/vomiting/or diarrhea, redness or drainage from the incisions, or any other worrisome concerns. If physician on call was not reached or could not communicate with our office for any reason please go to the nearest emergency department

## 2020-10-23 ENCOUNTER — TELEPHONE (OUTPATIENT)
Dept: PAIN MEDICINE | Facility: CLINIC | Age: 55
End: 2020-10-23

## 2020-10-23 ENCOUNTER — PATIENT MESSAGE (OUTPATIENT)
Dept: PAIN MEDICINE | Facility: CLINIC | Age: 55
End: 2020-10-23

## 2020-10-23 NOTE — TELEPHONE ENCOUNTER
Tried to call to r.s appt on 10/30. No answer. Left   Farzad Spangler, MA  [facility]   [facility]

## 2020-11-06 ENCOUNTER — OFFICE VISIT (OUTPATIENT)
Dept: PAIN MEDICINE | Facility: CLINIC | Age: 55
End: 2020-11-06
Payer: COMMERCIAL

## 2020-11-06 VITALS
DIASTOLIC BLOOD PRESSURE: 82 MMHG | HEIGHT: 72 IN | SYSTOLIC BLOOD PRESSURE: 118 MMHG | BODY MASS INDEX: 27.01 KG/M2 | HEART RATE: 82 BPM | WEIGHT: 199.44 LBS

## 2020-11-06 DIAGNOSIS — M54.16 LUMBAR RADICULOPATHY: Primary | ICD-10-CM

## 2020-11-06 DIAGNOSIS — M67.951 TENDINOPATHY OF RIGHT GLUTEAL REGION: ICD-10-CM

## 2020-11-06 DIAGNOSIS — M62.50 DISUSE ATROPHY OF MUSCLE: ICD-10-CM

## 2020-11-06 PROCEDURE — 3008F PR BODY MASS INDEX (BMI) DOCUMENTED: ICD-10-PCS | Mod: CPTII,S$GLB,, | Performed by: PHYSICAL MEDICINE & REHABILITATION

## 2020-11-06 PROCEDURE — 99999 PR PBB SHADOW E&M-EST. PATIENT-LVL IV: CPT | Mod: PBBFAC,,, | Performed by: PHYSICAL MEDICINE & REHABILITATION

## 2020-11-06 PROCEDURE — 99999 PR PBB SHADOW E&M-EST. PATIENT-LVL IV: ICD-10-PCS | Mod: PBBFAC,,, | Performed by: PHYSICAL MEDICINE & REHABILITATION

## 2020-11-06 PROCEDURE — 99213 OFFICE O/P EST LOW 20 MIN: CPT | Mod: S$GLB,,, | Performed by: PHYSICAL MEDICINE & REHABILITATION

## 2020-11-06 PROCEDURE — 3008F BODY MASS INDEX DOCD: CPT | Mod: CPTII,S$GLB,, | Performed by: PHYSICAL MEDICINE & REHABILITATION

## 2020-11-06 PROCEDURE — 99213 PR OFFICE/OUTPT VISIT, EST, LEVL III, 20-29 MIN: ICD-10-PCS | Mod: S$GLB,,, | Performed by: PHYSICAL MEDICINE & REHABILITATION

## 2020-11-06 NOTE — PROGRESS NOTES
Established Patient Chronic Pain Note (Follow up visit)    Chief Complaint:   Chief Complaint   Patient presents with    Follow-up     Inj    Hip Pain       SUBJECTIVE:    Bry Gloria Jr. is a 55 y.o. male who presents to the clinic for a follow-up appointment status post SANJEEV.  He recently underwent right L4-5 and L5-S1 TFESI on 09/24/2020.  He reports that this resulted in at least 30% relief, but he does report that his leg pain is completely resolved.  He locates his pain primarily to the right hip currently..  Since the last visit, Bry Gloria Jr. states the pain has been improving. Current pain intensity is 4/10.    Patient denies night fever/night sweats, urinary incontinence, bowel incontinence, significant weight loss, significant motor weakness and loss of sensations.    Pain Disability Index Review:   No flowsheet data found.    Initial HPI 09/11/2020:  Bry Gloria Jr. is a 54 y.o. male who presents to the clinic for the evaluation of lower back pain.  He was referred by the neurosurgery department for further evaluation and management of this pain.  Neurosurgery is requesting a L4-5 SANJEEV for diagnostic and therapeutic purposes.  Of note, patient has past medical history of hyperlipidemia and hypertension.  The pain started 8-9 months ago without any specific injury or trauma and symptoms have been worsening.The pain is located in the lower lumbar area and radiates to the right lower extremity mostly over the lateral hip with extension posteriorly to the calf.  The pain is described as Stabbing, numbness and is rated as 3/10. The pain is rated with a score of  3/10 on the BEST day and a score of 9/10 on the WORST day.  Symptoms interfere with daily activity, sleeping and work. The pain is exacerbated by sitting down, rest, lying down.  The pain is mitigated by light movement. The patient reports spending less than 2 hours per day reclining. The patient reports 5-7 hours of uninterrupted sleep per  night.  He owns and operates a plumbing company.        Non-Pharmacologic Treatments:  Physical Therapy/Home Exercise: yes  Ice/Heat:yes  TENS: no  Acupuncture: no  Massage: yes  Chiropractic: yes    Other: no        Pain Medications:  - Opioids: None  - Adjuvant Medications: Medrol Dosepak, Aleve  - Anti-Coagulants: Aspirin      report:  Reviewed and consistent with medication use as prescribed.  No controlled substances recently prescribed.     Pain Procedures:   -09/24/2020:  Right L4-5 and L5-S1 TFESI, 30% relief, but reports complete resolution of leg pain        Imaging:   MRI lumbar spine 08/27/2020:        PMHx,PSHx, Social history, and Family history:  I have reviewed the patient's medical, surgical, social, and family history in detail and updated the computerized patient record.    Review of patient's allergies indicates:   Allergen Reactions    Crestor [rosuvastatin] Other (See Comments)    Penicillins Rash       Current Outpatient Medications   Medication Sig    aspirin (ECOTRIN) 81 MG EC tablet Take 81 mg by mouth once daily.    ezetimibe (ZETIA) 10 mg tablet Take 10 mg by mouth once daily.    rosuvastatin (CRESTOR) 5 MG tablet Take 1 tablet (5 mg total) by mouth every other day.    valsartan (DIOVAN) 80 MG tablet Take 80 mg by mouth once daily.     No current facility-administered medications for this visit.          REVIEW OF SYSTEMS:    GENERAL:  No weight loss, malaise or fevers.  HEENT:   No recent changes in vision or hearing  NECK:  Negative for lumps, no difficulty with swallowing.  RESPIRATORY:  Negative for cough, wheezing or shortness of breath, patient denies any recent URI.  CARDIOVASCULAR:  Negative for chest pain, leg swelling or palpitations.  GI:  Negative for abdominal discomfort, blood in stools or black stools or change in bowel habits.  MUSCULOSKELETAL:  See HPI.  SKIN:  Negative for lesions, rash, and itching.  PSYCH:  No mood disorder or recent psychosocial stressors.   Patients sleep is not disturbed secondary to pain.  HEMATOLOGY/LYMPHOLOGY:  Negative for prolonged bleeding, bruising easily or swollen nodes.  Patient is currently taking anti-coagulants - ASA  NEURO:   No history of headaches, syncope, paralysis, seizures or tremors.  All other reviewed and negative other than HPI.       OBJECTIVE:    /82 (BP Location: Left arm)   Pulse 82   Ht 6' (1.829 m)   Wt 90.5 kg (199 lb 6.5 oz)   BMI 27.04 kg/m²     PHYSICAL EXAMINATION:    GENERAL: Well appearing, in no acute distress, alert and oriented x3.  PSYCH:  Mood and affect appropriate.  SKIN: Skin color, texture, turgor normal, no rashes or lesions.  HEAD/FACE:  Normocephalic, atraumatic. Cranial nerves grossly intact.  CV: RRR with palpation of the radial artery.  PULM: No evidence of respiratory difficulty, symmetric chest rise.  GI:  Soft and non-tender.  BACK: Straight leg raising in the sitting and supine positions is  equivocal  to radicular pain on the right.  Minimal pain to palpation over the facet joints of the lumbar spine and lumbar paraspinals.  Fair range of motion without pain reproduction.  EXTREMITIES: Peripheral joint ROM is full and pain free without obvious instability or laxity in all four extremities. No deformities, edema, or skin discoloration. Good capillary refill.  MUSCULOSKELETAL:   tenderness palpation over the right gluteal region.   There is no pain with palpation over the sacroiliac joints bilaterally.  FABERs test is negative.  FADIRs test is negative.   Bilateral upper and lower extremity strength is normal and symmetric.  No atrophy or tone abnormalities are noted.  NEURO: Bilateral upper and lower extremity coordination and muscle stretch reflexes are physiologic and symmetric.  Plantar response are downgoing. No clonus.  No loss of sensation is noted.  GAIT: normal.      LABS:  Lab Results   Component Value Date    WBC CANCELED 08/25/2020    HGB CANCELED 08/25/2020    HCT CANCELED  08/25/2020    PLT CANCELED 08/25/2020       CMP  Sodium   Date Value Ref Range Status   08/25/2020 141 134 - 144 mmol/L Final     Potassium   Date Value Ref Range Status   08/25/2020 4.4 3.5 - 5.2 mmol/L Final     Chloride   Date Value Ref Range Status   08/25/2020 101 96 - 106 mmol/L Final     CO2   Date Value Ref Range Status   08/25/2020 23 20 - 29 mmol/L Final     Glucose   Date Value Ref Range Status   08/25/2020 82 65 - 99 mg/dL Final     BUN   Date Value Ref Range Status   08/25/2020 17 6 - 24 mg/dL Final     Creatinine   Date Value Ref Range Status   08/25/2020 1.11 0.76 - 1.27 mg/dL Final     Calcium   Date Value Ref Range Status   08/25/2020 9.3 8.7 - 10.2 mg/dL Final     Albumin   Date Value Ref Range Status   08/25/2020 4.4 3.8 - 4.9 g/dL Final     Total Bilirubin   Date Value Ref Range Status   08/25/2020 0.7 0.0 - 1.2 mg/dL Final     AST   Date Value Ref Range Status   08/25/2020 22 0 - 40 IU/L Final     ALT   Date Value Ref Range Status   08/25/2020 15 0 - 44 IU/L Final     eGFR if non    Date Value Ref Range Status   08/25/2020 74 >59 mL/min/1.73 Final       No results found for: LABA1C, HGBA1C          ASSESSMENT: 55 y.o. year old male with low back and hip pain, consistent with     1. Lumbar radiculopathy  Ambulatory referral/consult to Physical/Occupational Therapy   2. Disuse atrophy of muscle  Ambulatory referral/consult to Physical/Occupational Therapy   3. Tendinopathy of right gluteal region           PLAN:   - Interventions:  none at this time     - Anticoagulation use: yes aspirin     - Medications: I have stressed the importance of physical activity and a home exercise plan to help with pain and improve health. and Patient can continue with medications for now since they are providing benefits, using them appropriately, and without side effects.      - Therapy:   referral placed for physical therapy to help address gluteal tendinopathy and generalized right lower extremity  weakness secondary to disuse atrophy.     - Labs:  Reviewed     - Imaging: Reviewed available imaging with patient and answered any questions they had regarding study.     - Consults/Referrals:  None at this time     - Records:  Reviewed/Obtain old records from outside physicians and imaging     - Follow up visit: return to clinic  8-10 weeks or as needed     - Counseled patient regarding the importance of activity modification and physical therapy     - This condition does not require this patient to take time off of work, and the primary goal of our Pain Management services is to improve the patient's functional capacity.     - Patient Questions: Answered all of the patient's questions regarding diagnosis, therapy, and treatment    The above plan and management options were discussed at length with patient. Patient is in agreement with the above and verbalized understanding.      Amrit Moreno MD  Interventional Pain Management  [facility]    Disclaimer:  This note was prepared using voice recognition system and is likely to have sound alike errors that may have been overlooked even after proof reading.  Please call me with any questions

## 2021-05-12 ENCOUNTER — PATIENT MESSAGE (OUTPATIENT)
Dept: RESEARCH | Facility: HOSPITAL | Age: 56
End: 2021-05-12

## 2021-09-09 PROBLEM — E03.9 ACQUIRED HYPOTHYROIDISM: Chronic | Status: ACTIVE | Noted: 2021-09-09

## 2022-03-10 PROBLEM — M54.16 LUMBAR RADICULOPATHY: Chronic | Status: ACTIVE | Noted: 2020-09-24

## 2022-03-14 PROBLEM — I10 ESSENTIAL HYPERTENSION: Chronic | Status: ACTIVE | Noted: 2022-03-14

## 2022-06-02 PROBLEM — E03.9 ACQUIRED HYPOTHYROIDISM: Chronic | Status: RESOLVED | Noted: 2021-09-09 | Resolved: 2022-06-02

## 2022-06-02 PROBLEM — G95.9 LUMBAR MYELOPATHY: Status: ACTIVE | Noted: 2022-06-02

## 2022-09-16 PROBLEM — Z00.01 ENCOUNTER FOR GENERAL ADULT MEDICAL EXAMINATION WITH ABNORMAL FINDINGS: Status: ACTIVE | Noted: 2022-09-16

## 2022-10-13 ENCOUNTER — TELEPHONE (OUTPATIENT)
Dept: ENDOCRINOLOGY | Facility: CLINIC | Age: 57
End: 2022-10-13
Payer: COMMERCIAL

## 2022-10-13 NOTE — TELEPHONE ENCOUNTER
----- Message from Summer Mccarthy sent at 10/13/2022  1:20 PM CDT -----  .Type: Appointment Request     Caller is requesting appointment np referral in epic     Was A Appointment Solution Found When Scheduling? None     Best Call Back Number:  [phone]    Additional Information: None

## 2022-10-13 NOTE — TELEPHONE ENCOUNTER
S/w pt. Scheduled new patient appt for tomorrow 10/14/22 at [facility]. Pt verbalized understanding of date/time/location.

## 2022-10-14 ENCOUNTER — OFFICE VISIT (OUTPATIENT)
Dept: ENDOCRINOLOGY | Facility: CLINIC | Age: 57
End: 2022-10-14
Payer: COMMERCIAL

## 2022-10-14 VITALS
RESPIRATION RATE: 18 BRPM | HEART RATE: 88 BPM | HEIGHT: 72 IN | SYSTOLIC BLOOD PRESSURE: 122 MMHG | BODY MASS INDEX: 26.69 KG/M2 | WEIGHT: 197.06 LBS | DIASTOLIC BLOOD PRESSURE: 78 MMHG

## 2022-10-14 DIAGNOSIS — E78.5 HYPERLIPIDEMIA, UNSPECIFIED HYPERLIPIDEMIA TYPE: Primary | ICD-10-CM

## 2022-10-14 DIAGNOSIS — Z91.89 AT RISK FOR ALLERGIC REACTION TO MEDICATION: ICD-10-CM

## 2022-10-14 DIAGNOSIS — E03.9 ACQUIRED HYPOTHYROIDISM: ICD-10-CM

## 2022-10-14 PROCEDURE — 3074F PR MOST RECENT SYSTOLIC BLOOD PRESSURE < 130 MM HG: ICD-10-PCS | Mod: CPTII,S$GLB,, | Performed by: INTERNAL MEDICINE

## 2022-10-14 PROCEDURE — 1159F PR MEDICATION LIST DOCUMENTED IN MEDICAL RECORD: ICD-10-PCS | Mod: CPTII,S$GLB,, | Performed by: INTERNAL MEDICINE

## 2022-10-14 PROCEDURE — 99204 OFFICE O/P NEW MOD 45 MIN: CPT | Mod: S$GLB,,, | Performed by: INTERNAL MEDICINE

## 2022-10-14 PROCEDURE — 1160F PR REVIEW ALL MEDS BY PRESCRIBER/CLIN PHARMACIST DOCUMENTED: ICD-10-PCS | Mod: CPTII,S$GLB,, | Performed by: INTERNAL MEDICINE

## 2022-10-14 PROCEDURE — 3078F DIAST BP <80 MM HG: CPT | Mod: CPTII,S$GLB,, | Performed by: INTERNAL MEDICINE

## 2022-10-14 PROCEDURE — 1159F MED LIST DOCD IN RCRD: CPT | Mod: CPTII,S$GLB,, | Performed by: INTERNAL MEDICINE

## 2022-10-14 PROCEDURE — 4010F ACE/ARB THERAPY RXD/TAKEN: CPT | Mod: CPTII,S$GLB,, | Performed by: INTERNAL MEDICINE

## 2022-10-14 PROCEDURE — 99999 PR PBB SHADOW E&M-EST. PATIENT-LVL III: ICD-10-PCS | Mod: PBBFAC,,, | Performed by: INTERNAL MEDICINE

## 2022-10-14 PROCEDURE — 3078F PR MOST RECENT DIASTOLIC BLOOD PRESSURE < 80 MM HG: ICD-10-PCS | Mod: CPTII,S$GLB,, | Performed by: INTERNAL MEDICINE

## 2022-10-14 PROCEDURE — 99999 PR PBB SHADOW E&M-EST. PATIENT-LVL III: CPT | Mod: PBBFAC,,, | Performed by: INTERNAL MEDICINE

## 2022-10-14 PROCEDURE — 99204 PR OFFICE/OUTPT VISIT, NEW, LEVL IV, 45-59 MIN: ICD-10-PCS | Mod: S$GLB,,, | Performed by: INTERNAL MEDICINE

## 2022-10-14 PROCEDURE — 3074F SYST BP LT 130 MM HG: CPT | Mod: CPTII,S$GLB,, | Performed by: INTERNAL MEDICINE

## 2022-10-14 PROCEDURE — 4010F PR ACE/ARB THEARPY RXD/TAKEN: ICD-10-PCS | Mod: CPTII,S$GLB,, | Performed by: INTERNAL MEDICINE

## 2022-10-14 PROCEDURE — 1160F RVW MEDS BY RX/DR IN RCRD: CPT | Mod: CPTII,S$GLB,, | Performed by: INTERNAL MEDICINE

## 2022-10-14 RX ORDER — LEVOTHYROXINE SODIUM 50 UG/1
50 TABLET ORAL
Qty: 30 TABLET | Refills: 11 | Status: SHIPPED | OUTPATIENT
Start: 2022-10-14 | End: 2023-09-25 | Stop reason: ALTCHOICE

## 2022-10-14 NOTE — PROGRESS NOTES
CHIEF COMPLAINT:  hypothyroidism  56 y.o. old being seen as a new patient.  TSH has been elevated.  Appears that he tried levoxyl 50 mcg. States that he developed a rash. States that he stopped thyroid medication as well as other medications. Restarted BP meds and no issues. When restarted thyroid medication, he started itching again. Has some fatigue. No constipation. States is always cold natured, and no change from baseline. He does have hyperlipidemia.       PAST MEDICAL HISTORY/PAST SURGICAL HISTORY:  Reviewed in [facility]    SOCIAL HISTORY: Reviewed in [facility]    FAMILY HISTORY:  No known thyroid disease. + DM 2.     MEDICATIONS/ALLERGIES: The patient's MedCard has been updated and reviewed.            PE:    GENERAL: Well developed, well nourished.  NECK: Supple, trachea midline,   No palpable thyroid nodules.  CHEST: Resp even and unlabored, CTA bilateral.  CARDIAC: RRR, S1, S2 heard, no murmurs, rubs, S3, or S4  SKIN: no acanthosis nigracans.    LABS/Radiology     Latest Reference Range & Units 03/04/21 09:15 04/29/22 07:44 10/05/22 09:58   TSH 0.450 - 4.500 uIU/mL 3.120 3.060 6.220 (H)   PSA - [facility] 0.0 - 4.0 ng/mL 1.2 1.4    (H): Data is abnormally high    ASSESSMENT/PLAN:    Subclinical hypothyroidism -has mildly elevated TSH.  Main symptom is fatigue.   No other symptoms suggestive of hypothyroidism.  Appears to have been allergic to generic levothyroxine.  Can try brand-name Synthroid 50 mcg.   If that does not work can try Tirosint.  If continues to have allergic  reaction, may need to see Allergy.    2.   Hyperlipidemia- can see if any improvement once TFTs normalized.  However TSH mildly suppressed and unsure impact.      FOLLOWUP  TSH 6 weeks

## 2022-12-19 PROBLEM — Z00.01 ENCOUNTER FOR GENERAL ADULT MEDICAL EXAMINATION WITH ABNORMAL FINDINGS: Status: RESOLVED | Noted: 2022-09-16 | Resolved: 2022-12-19

## 2023-09-25 PROBLEM — E78.01 FAMILIAL HYPERCHOLESTEROLEMIA: Status: ACTIVE | Noted: 2023-09-25

## 2023-10-18 ENCOUNTER — PATIENT MESSAGE (OUTPATIENT)
Dept: SURGERY | Facility: CLINIC | Age: 58
End: 2023-10-18

## 2023-10-18 ENCOUNTER — OFFICE VISIT (OUTPATIENT)
Dept: SURGERY | Facility: CLINIC | Age: 58
End: 2023-10-18
Payer: COMMERCIAL

## 2023-10-18 VITALS
SYSTOLIC BLOOD PRESSURE: 128 MMHG | OXYGEN SATURATION: 96 % | HEART RATE: 81 BPM | HEIGHT: 72 IN | DIASTOLIC BLOOD PRESSURE: 78 MMHG | TEMPERATURE: 97 F | WEIGHT: 212.94 LBS | RESPIRATION RATE: 16 BRPM | BODY MASS INDEX: 28.84 KG/M2

## 2023-10-18 DIAGNOSIS — K62.5 RECTAL BLEEDING: Primary | ICD-10-CM

## 2023-10-18 DIAGNOSIS — Z86.010 HX OF COLONIC POLYPS: ICD-10-CM

## 2023-10-18 PROCEDURE — 99999 PR PBB SHADOW E&M-EST. PATIENT-LVL III: CPT | Mod: PBBFAC,,, | Performed by: COLON & RECTAL SURGERY

## 2023-10-18 PROCEDURE — 3008F BODY MASS INDEX DOCD: CPT | Mod: CPTII,S$GLB,, | Performed by: COLON & RECTAL SURGERY

## 2023-10-18 PROCEDURE — 3074F PR MOST RECENT SYSTOLIC BLOOD PRESSURE < 130 MM HG: ICD-10-PCS | Mod: CPTII,S$GLB,, | Performed by: COLON & RECTAL SURGERY

## 2023-10-18 PROCEDURE — 3074F SYST BP LT 130 MM HG: CPT | Mod: CPTII,S$GLB,, | Performed by: COLON & RECTAL SURGERY

## 2023-10-18 PROCEDURE — 99204 OFFICE O/P NEW MOD 45 MIN: CPT | Mod: S$GLB,,, | Performed by: COLON & RECTAL SURGERY

## 2023-10-18 PROCEDURE — 1159F MED LIST DOCD IN RCRD: CPT | Mod: CPTII,S$GLB,, | Performed by: COLON & RECTAL SURGERY

## 2023-10-18 PROCEDURE — 3078F DIAST BP <80 MM HG: CPT | Mod: CPTII,S$GLB,, | Performed by: COLON & RECTAL SURGERY

## 2023-10-18 PROCEDURE — 99204 PR OFFICE/OUTPT VISIT, NEW, LEVL IV, 45-59 MIN: ICD-10-PCS | Mod: S$GLB,,, | Performed by: COLON & RECTAL SURGERY

## 2023-10-18 PROCEDURE — 3008F PR BODY MASS INDEX (BMI) DOCUMENTED: ICD-10-PCS | Mod: CPTII,S$GLB,, | Performed by: COLON & RECTAL SURGERY

## 2023-10-18 PROCEDURE — 1159F PR MEDICATION LIST DOCUMENTED IN MEDICAL RECORD: ICD-10-PCS | Mod: CPTII,S$GLB,, | Performed by: COLON & RECTAL SURGERY

## 2023-10-18 PROCEDURE — 99999 PR PBB SHADOW E&M-EST. PATIENT-LVL III: ICD-10-PCS | Mod: PBBFAC,,, | Performed by: COLON & RECTAL SURGERY

## 2023-10-18 PROCEDURE — 3078F PR MOST RECENT DIASTOLIC BLOOD PRESSURE < 80 MM HG: ICD-10-PCS | Mod: CPTII,S$GLB,, | Performed by: COLON & RECTAL SURGERY

## 2023-10-18 RX ORDER — LOSARTAN POTASSIUM AND HYDROCHLOROTHIAZIDE 12.5; 5 MG/1; MG/1
1 TABLET ORAL EVERY MORNING
COMMUNITY
Start: 2023-10-07

## 2023-10-18 NOTE — PROGRESS NOTES
CRS Office/In-Patient History and Physical    Referring MD:   Self, Aaareferral  No address on file    SUBJECTIVE:     History of Present Illness:  Patient is a 57 y.o. male with a Family Hx of colon polyps (Father, Brother, Sister) and personal Hx of colon polyps removed at colonoscopy (2014 & 2019) who notes occasional spotting of BRB per rectum from a palpable source near the anus over the past 1-2 years. He denies any significant pain, swelling or drainage (e.g. pus). He has regular Bms and denies constipation or diarrhea.     Last Colonoscopy: 7/15/2019    I have reviewed the following images and labs:  No results found for this or any previous visit.       Lab Results   Component Value Date    WBC 7.2 09/25/2023    HGB 15.2 09/25/2023    HCT 46.9 09/25/2023     09/25/2023    CHOL 140 09/25/2023    TRIG 298 (H) 09/25/2023    HDL 47 09/25/2023    ALT 27 09/25/2023    AST 23 09/25/2023     09/25/2023    K 4.5 09/25/2023     09/25/2023    CREATININE 1.00 09/25/2023    BUN 16 09/25/2023    CO2 23 09/25/2023    TSH 4.520 (H) 09/25/2023        Past Medical History:   Diagnosis Date    Benign essential HTN     Hyperlipidemia        Past Surgical History:   Procedure Laterality Date    CARDIOVASCULAR STRESS TEST  12/2020    Dr. Sotomayor    CHOLECYSTECTOMY      3-0 years ago    COLONOSCOPY  07/15/2019    Dr Soriano    TRANSFORAMINAL EPIDURAL INJECTION OF STEROID Right 09/24/2020    Procedure: right L4-5+ L5-S1 TF SANJEEV with local;  Surgeon: Amrit Moreno MD;  [facility]: [facility] PAIN T;  Service: Pain Management;  Laterality: Right;    WISDOM TOOTH EXTRACTION         Review of patient's allergies indicates:   Allergen Reactions    Crestor [rosuvastatin] Other (See Comments)     Extreme muscle pain    Levothyroxine Itching     Itching & Acid Reflux    Tramadol Swelling    Penicillins Rash       Current Outpatient Medications on File Prior to Visit   Medication Sig Dispense Refill    evolocumab (REPATHA  SURECLICK) 140 mg/mL PnChapincito Inject 140 mg into the skin every 14 (fourteen) days.      metoprolol succinate (TOPROL-XL) 50 MG 24 hr tablet Take 50 mg by mouth once daily. Taking only 1/2 at night      [DISCONTINUED] lisinopriL (PRINIVIL,ZESTRIL) 2.5 MG tablet Take 2.5 mg by mouth once daily.      losartan-hydrochlorothiazide 50-12.5 mg (HYZAAR) 50-12.5 mg per tablet Take 1 tablet by mouth every morning.      multivitamin (THERAGRAN) per tablet Take 1 tablet by mouth once daily.       No current facility-administered medications on file prior to visit.       Family History   Problem Relation Age of Onset    Cancer Father        Social History     Tobacco Use    Smoking status: Former     Current packs/day: 0.00     Types: Cigarettes     Quit date:      Years since quittin.8    Smokeless tobacco: Never   Substance Use Topics    Alcohol use: Yes     Alcohol/week: 6.0 standard drinks of alcohol     Types: 6 Cans of beer per week    Drug use: Never        Review of Systems:  Constitutional: Negative for fever, appetite change and unexpected weight change.  HENT: Negative for sore throat and trouble swallowing.  Eyes: Negative for visual disturbance.  Respiratory: Negative for chest tightness, shortness of breath and wheezing.  Cardiovascular: Negative for chest pain.  Gastrointestinal:  as per HPI  Genitourinary: Negative for dysuria, frequency and hematuria.  Musculoskeletal: Negative for myalgias, joint swelling and arthralgias.  Skin: Negative for color change and rash.   Neurological: Negative for syncope, weakness and headaches.  Psychiatric/Behavioral: Negative for confusion.      OBJECTIVE:     Vital Signs (Most Recent)  /78 (BP Location: Left arm, Patient Position: Sitting, BP Method: Medium (Manual))   Pulse 81   Temp 97 °F (36.1 °C) (Temporal)   Resp 16   Ht 6' (1.829 m)   Wt 96.6 kg (212 lb 15.4 oz)   SpO2 96%   BMI 28.88 kg/m²            Physical Exam:  GENERAL:  Comfortable, in no acute  distress.      SKIN: Non-jaundiced  EXTREMITIES:  No edema.     NEURO: CN II-XII intact; motor/sensory non-focal.                            HEENT EXAM:  Nonicteric.  No adenopathy.  Oropharynx is clear.               NECK:  Supple.                                                               LUNGS:  Clear.                                                               CARDIAC:  Regular rate and rhythm.  S1, S2.  No murmur.                      ABDOMEN:  Soft, positive bowel sounds, nontender.  No hepatosplenomegaly or masses.  No rebound or guarding.                                                Anorectal:   Preoperative Diagnosis: Rectal Bleeding  Postoperative Diagnosis: Same  Procedure: Anoscopy  Inspection: normal except 'ulcer' noted in direct MLP position of the anal canal  JONNY:  normal  Anoscopy: All four quadrants inspected.   Findings: early stage hemorrhoids, no active/recent bleed  The patient tolerated the procedure well.                                                     ASSESSMENT:     Encounter Diagnoses   Name Primary?    Rectal bleeding Yes    Hx of colonic polyps           PLAN:    Reassured regarding the findings on examination. There is no obvious evidence of recent bleeding or source of bleed, although the patient is convinced the source is the palpable lesion in the posterior anal canal. Given his personal Hx of polyps removed at both prior colonoscopies (10 & 5 years ago), consider surveillance colonoscopy to further address this problem- his last colonoscopy was in 2019. We will make the appropriate arrangements in the near future when it is convenient for him. He may ultimately require EUA to address the anal canal lesion which may be an external sinus opening of an underlying anal fistula tract.

## 2023-12-20 PROBLEM — K60.2 ANAL FISSURE: Status: ACTIVE | Noted: 2023-12-20

## 2023-12-20 PROBLEM — K62.5 RECTAL BLEEDING: Status: ACTIVE | Noted: 2023-12-20

## 2024-04-11 PROBLEM — L57.0 SENILE KERATOSIS: Status: ACTIVE | Noted: 2024-04-11

## 2024-09-22 ENCOUNTER — TELEPHONE (OUTPATIENT)
Dept: PHARMACY | Facility: CLINIC | Age: 59
End: 2024-09-22
Payer: COMMERCIAL

## 2024-09-22 NOTE — TELEPHONE ENCOUNTER
[facility]/Population Health Chart Review & Patient Outreach Details For Medication Adherence Project    Reason for Outreach Encounter: 3rd Party payor non-compliance report ([employer], [employer], C, etc)  2.  Patient Outreach Method: Reviewed Patient Chart  3.   Medication in question: hyzaar   LAST FILLED: 9/13/24 for 90 day supply  Hypertension Medications               losartan-hydrochlorothiazide 50-12.5 mg (HYZAAR) 50-12.5 mg per tablet Take 1 tablet by mouth every morning.    metoprolol tartrate (LOPRESSOR) 25 MG tablet Take 1 tablet (25 mg total) by mouth 2 (two) times daily. STOP THE extended release              4.  Reviewed and or Updates Made To: Patient Chart  5. Outreach Outcomes and/or actions taken: Patient filled medication and is on track to be adherent

## 2024-10-19 PROBLEM — R41.3 EPISODIC MEMORY LOSS: Status: ACTIVE | Noted: 2024-10-19

## 2024-10-19 PROBLEM — L57.0 SENILE KERATOSIS: Chronic | Status: ACTIVE | Noted: 2024-04-11

## 2024-11-13 ENCOUNTER — OFFICE VISIT (OUTPATIENT)
Dept: NEUROSURGERY | Facility: CLINIC | Age: 59
End: 2024-11-13
Payer: COMMERCIAL

## 2024-11-13 VITALS
BODY MASS INDEX: 29.26 KG/M2 | WEIGHT: 216.06 LBS | SYSTOLIC BLOOD PRESSURE: 130 MMHG | HEIGHT: 72 IN | HEART RATE: 83 BPM | RESPIRATION RATE: 18 BRPM | DIASTOLIC BLOOD PRESSURE: 78 MMHG

## 2024-11-13 DIAGNOSIS — G96.810 INTRACRANIAL HYPOTENSION: ICD-10-CM

## 2024-11-13 PROCEDURE — 3044F HG A1C LEVEL LT 7.0%: CPT | Mod: CPTII,S$GLB,,

## 2024-11-13 PROCEDURE — 3075F SYST BP GE 130 - 139MM HG: CPT | Mod: CPTII,S$GLB,,

## 2024-11-13 PROCEDURE — 3008F BODY MASS INDEX DOCD: CPT | Mod: CPTII,S$GLB,,

## 2024-11-13 PROCEDURE — 3078F DIAST BP <80 MM HG: CPT | Mod: CPTII,S$GLB,,

## 2024-11-13 PROCEDURE — 1159F MED LIST DOCD IN RCRD: CPT | Mod: CPTII,S$GLB,,

## 2024-11-13 PROCEDURE — 99203 OFFICE O/P NEW LOW 30 MIN: CPT | Mod: S$GLB,,,

## 2024-11-13 NOTE — PROGRESS NOTES
"Neurosurgery History & Physical    Patient ID: Bry Gloria Jr. is a 59 y.o. male.    Chief Complaint   Patient presents with    Establish Care       HPI:  Mr. Gloria is a 60yo male who presents with his wife to establish care for intracranial hypotension.      This was an incidental finding after his PCP ordered a MRI brain w/wo contrast for "persistent mild headaches and memory issues".  He reports these issues, and also "significant fatigue", started after he began Metoprolol XL 1 year ago.  Prior to starting Metoprolol XL 1 year ago, he denies any headaches.      After starting Metoprolol XL, he reports "bad headaches" which he rated as 10/10.  Headaches were at crown/top of head.  He states that his head hurt so bad at times that he lost his memory twice for 1/4 of the day.  After discontinuing Metoprolol XL and starting Metoprolol for the past 2 months, he reports that his headaches have improved but can still have a mild, dull headache that he rates as 1-2/10.  Does not take any PO analgesics when headaches occur.  States that he does not take Metoprolol unless his HR  > 105.      He is a  and reports that he is up and down a lot with his job.  Denies postural headaches.  Denies nausea/emesis.  Denies diplopia and blurred vision.  Wears readers.  Reports his vision is "foggy", last eye exam 3 years ago.  Denies tinnitus, vertigo, hearing changes, facial numbness, numbness/tingling in bilateral arms, and other neurological symptoms.  Reports that he feels fatigued all the time (which started after beginning Metoprolol XL) but also expresses altered sleep patterns.      Denies history of lumbar puncture or spinal surgery.  Denies any previous injuries, trauma, or falls.    Review of Systems   Constitutional:  Positive for fatigue. Negative for activity change and fever.   Eyes:  Negative for visual disturbance.   Respiratory:  Negative for shortness of breath.    Cardiovascular:  Negative for chest " pain.   Gastrointestinal:  Negative for nausea and vomiting.   Endocrine: Negative for cold intolerance, heat intolerance, polydipsia, polyphagia and polyuria.   Genitourinary:  Negative for decreased urine volume, difficulty urinating and frequency.   Musculoskeletal:  Negative for back pain, gait problem and neck pain.   Neurological:  Positive for headaches. Negative for dizziness, tremors, seizures, syncope, facial asymmetry, speech difficulty, weakness, light-headedness and numbness.   Psychiatric/Behavioral:  Negative for confusion.        Past Medical History:   Diagnosis Date    Benign essential HTN     Hyperlipidemia      Social History     Socioeconomic History    Marital status:      Spouse name: Shante    Number of children: 2   Tobacco Use    Smoking status: Former     Current packs/day: 0.00     Types: Cigarettes     Quit date:      Years since quittin.8    Smokeless tobacco: Never   Substance and Sexual Activity    Alcohol use: Yes     Alcohol/week: 12.0 standard drinks of alcohol     Types: 12 Cans of beer per week    Drug use: Never    Sexual activity: Yes     Partners: Female     Birth control/protection: None     Social Drivers of Health     Stress: No Stress Concern Present (2020)    [facility] - Occupational Stress Questionnaire     Feeling of Stress : Not at all     Family History   Problem Relation Name Age of Onset    Cancer Father       Review of patient's allergies indicates:   Allergen Reactions    Crestor [rosuvastatin] Other (See Comments)     Extreme muscle pain    Levothyroxine Other (See Comments)     Acid Reflux    Tramadol Swelling    Penicillins Rash       Current Outpatient Medications:     ALPRAZolam (XANAX) 0.25 MG tablet, Take 4-6 tab 30-45  minutes before MRI, Disp: 90 tablet, Rfl: 0    atenoloL (TENORMIN) 25 MG tablet, Take 1 tablet (25 mg total) by mouth once daily. For rapid heart rate., Disp: 30 tablet, Rfl: 0    evolocumab  (REPATHA SURECLICK) 140 mg/mL PnIj, Inject 140 mg into the skin every 14 (fourteen) days., Disp: , Rfl:     levothyroxine (SYNTHROID) 50 MCG tablet, Take 1 tablet (50 mcg total) by mouth before breakfast., Disp: 30 tablet, Rfl: 6    losartan-hydrochlorothiazide 50-12.5 mg (HYZAAR) 50-12.5 mg per tablet, Take 1 tablet by mouth every morning., Disp: , Rfl:     multivitamin (THERAGRAN) per tablet, Take 1 tablet by mouth once daily., Disp: , Rfl:     chlorzoxazone (PARAFON FORTE) 500 mg Tab, Take 1 tablet (500 mg total) by mouth 4 (four) times daily as needed (back pain). (Patient not taking: Reported on 11/13/2024), Disp: 40 tablet, Rfl: 1  Blood pressure 130/78, pulse 83, resp. rate 18, height 6' (1.829 m), weight 98 kg (216 lb 0.8 oz).      Neurological Exam  Mental Status  Awake, alert and oriented to person, place and time.    Cranial Nerves  CN II: Visual acuity is normal. Visual fields full to confrontation.  CN III, IV, VI: Extraocular movements intact bilaterally. Normal lids and orbits bilaterally. Pupils equal round and reactive to light bilaterally.  CN V: Facial sensation is normal.  CN VII: Full and symmetric facial movement.  CN VIII: Hearing is normal.  CN IX, X: Palate elevates symmetrically. Normal gag reflex.  CN XI: Shoulder shrug strength is normal.  CN XII: Tongue midline without atrophy or fasciculations.    Motor   Strength is 5/5 throughout all four extremities.    Sensory  Light touch is normal in upper and lower extremities.     Reflexes                                            Right                      Left  Biceps                                 2+                         2+  Patellar                                2+                         2+    Gait  Casual gait is normal including stance, stride, and arm swing.      Physical Exam  Eyes:      General: Lids are normal.      Extraocular Movements: Extraocular movements intact.      Pupils: Pupils are equal, round, and reactive to light.    Neurological:      Motor: Motor strength is normal.     Deep Tendon Reflexes:      Reflex Scores:       Bicep reflexes are 2+ on the right side and 2+ on the left side.       Patellar reflexes are 2+ on the right side and 2+ on the left side.        Imaging:  MRI brain with/without contrast dated 11/08/2024 personally reviewed and discussed with patient.    FINDINGS:  INTRACRANIAL: Mild central predominant global volume loss.  Normal parenchymal signal and configuration.  Cerebellar tonsillar tips extend 5 mm beyond the foramen magnum preserved rounding of the tonsillar tips.  Mild slumping of the midbrain mildly closed haile/midbrain angle on sagittal view.  Mild diffuse uniform bilateral supratentorial and infratentorial dural thickening and enhancement.  No parenchymal restricted diffusion.  No evidence of intracranial hemorrhage.  No extra-axial fluid collection or mass.  No midline shift.  Basal cisterns are patent.  No hydrocephalus.  Visualized pituitary gland and infundibulum are normal.  Visualized major intracranial vascular structures demonstrate normal flow voids and are normal in course and caliber.     SINUSES: Paranasal sinuses are clear.  Left mastoid effusion.     ORBITS: Visualized orbits are normal.     Impression:     Diffuse bilateral uniform dural thickening and enhancement with inferior displacement of the tonsils 5 mm beyond the foramen magnum with preserved rounding of the tonsillar tips and slight slumping of the midbrain.  Findings are suggestive of intracranial hypotension.        Electronically signed by:Tristan Lloyd MD  Date:                                            11/08/2024  Time:                                           12:00    Assessment/Plan:    Mr. Gloria is a 58yo male with incidental finding of intracranial hypotension as identified on MRI dated 11/08/2024.  I discussed that intracranial hypotension is caused from low CSF pressure in the brain.  It can be due to a  "variety of reasons including a CSF leak after lumbar puncture, a dural defect, or previous spinal surgery.  I discussed that we can order a full spine MRI to look for any possible CSF leaks, however, I think it is unlikely as he has no history of lumbar puncture or spinal surgery.  I discussed that if a CSF leak were to be found then the most common treatment is epidural patching.  After discussion, Mr. Gloria does not feel it is necessary to undergo full spine MRIs at this time, as his symptoms have essentially resolved since discontinuing Metoprolol XL and starting Metoprolol.  He will contact our office if he develops worsening headaches or other symptoms associated with intracranial hypotension and we will order full spine MRI at that time.  I recommend that he undergo an updated eye exam to evaluate his "foggy" vision, as his last visual exam was 3 years ago, to which he is agreeable.      We discussed that his fatigue could be due to diagnosed hypothyroidism for which he has not started levothyroxine 75mcg daily or from his altered sleep patterns.  He reports falling asleep around 7PM and waking around 11PM or midnight, and then being up for awhile before falling back asleep.  I also discussed possible HOWARD, which could lead to daytime somnolence, as his wife reports that he snores at times.  He will follow-up with his PCP for further evaluation and management.  He is agreeable with the plan.    "

## 2024-11-22 ENCOUNTER — TELEPHONE (OUTPATIENT)
Dept: NEUROSURGERY | Facility: CLINIC | Age: 59
End: 2024-11-22
Payer: COMMERCIAL

## 2024-11-22 ENCOUNTER — PATIENT MESSAGE (OUTPATIENT)
Dept: NEUROSURGERY | Facility: CLINIC | Age: 59
End: 2024-11-22
Payer: COMMERCIAL

## 2024-11-22 DIAGNOSIS — G96.810 INTRACRANIAL HYPOTENSION: Primary | ICD-10-CM

## 2024-11-22 NOTE — TELEPHONE ENCOUNTER
I ordered MRIs of cervical, thoracic, and lumbar spine to evaluate for possible CSF leak as his MRI brain dated 11/08/2024 is suspicious for intracranial hypotension. Our office will reach out to Mr. Gloria to get MRIs scheduled and I will call him with the results.

## 2024-11-29 ENCOUNTER — TELEPHONE (OUTPATIENT)
Dept: NEUROSURGERY | Facility: CLINIC | Age: 59
End: 2024-11-29
Payer: COMMERCIAL

## 2024-11-29 NOTE — TELEPHONE ENCOUNTER
I attempted to reach Mr. Gloria on 11/25/2024 and 11/26/2024 but unfortunately my calls went to his company [employer].  I was able to reach him via phone on 11/27/2024 and we discussed his upcoming MRIs with him.  He will reach out to our office if he has any additional questions or concerns.     General

## 2024-12-19 ENCOUNTER — PATIENT MESSAGE (OUTPATIENT)
Dept: NEUROSURGERY | Facility: CLINIC | Age: 59
End: 2024-12-19
Payer: COMMERCIAL

## 2024-12-19 DIAGNOSIS — M54.6 PAIN IN THORACIC SPINE: ICD-10-CM

## 2024-12-19 DIAGNOSIS — G96.810 INTRACRANIAL HYPOTENSION: Primary | ICD-10-CM

## 2024-12-20 NOTE — TELEPHONE ENCOUNTER
I called and spoke with Mr. Gloria about his MRI recents.  We discussed that his findings were inconclusive on the MRIs of his thoracic and lumbar spine.  We discussed that the next step would be a blood patch.  However, he continues to do well and is not experiencing headaches at this time.  Therefore, we are going to monitor with image surveillance and will obtain repeat MRI brain, cervical, thoracic, and lumbar spine in 6 months.  He is agreeable to the plan and will notify our office if he has any questions or concerns in the meantime.

## 2025-02-21 ENCOUNTER — TELEPHONE (OUTPATIENT)
Dept: PHARMACY | Facility: CLINIC | Age: 60
End: 2025-02-21
Payer: COMMERCIAL

## 2025-02-22 NOTE — TELEPHONE ENCOUNTER
[facility]/Population Health Chart Review & Patient Outreach Details For Medication Adherence Project    Reason for Outreach Encounter: 3rd Party payor non-compliance report ([employer], [employer], [employer], etc)  2.  Patient Outreach Method: Reviewed patient chart   3.   Medication in question:    Hypertension Medications              atenoloL (TENORMIN) 25 MG tablet Take 1 tablet (25 mg total) by mouth once daily. For rapid heart rate.    losartan-hydrochlorothiazide 50-12.5 mg (HYZAAR) 50-12.5 mg per tablet Take 1 tablet by mouth every morning.                 LF 90 ds 12/22/24    4.  Reviewed and or Updates Made To: Patient Chart  5. Outreach Outcomes and/or actions taken: Patient filled medication and is on track to be adherent  Additional Notes:

## 2025-04-25 ENCOUNTER — TELEPHONE (OUTPATIENT)
Dept: PHARMACY | Facility: CLINIC | Age: 60
End: 2025-04-25
Payer: COMMERCIAL

## 2025-04-25 NOTE — TELEPHONE ENCOUNTER
[facility]/Population Health Chart Review & Patient Outreach Details For Medication Adherence Project    Reason for Outreach Encounter: 3rd Party payor non-compliance report ([employer], [employer], [employer], etc)  2.  Patient Outreach Method: Reviewed patient chart  and SCOUP[employer] message  3.   Medication in question:    Hypertension Medications              atenoloL (TENORMIN) 25 MG tablet Take 1 tablet (25 mg total) by mouth once daily. For rapid heart rate.    losartan-hydrochlorothiazide 50-12.5 mg (HYZAAR) 50-12.5 mg per tablet Take 1 tablet by mouth every morning.                 losartan-hydrochlorothiazide  last filled  9/13/24 for 90 day supply      4.  Reviewed and or Updates Made To: Patient Chart  5. Outreach Outcomes and/or actions taken: Sent inquiry to patient: Waiting for response  Additional Notes:

## 2025-04-26 PROBLEM — E78.2 MIXED HYPERLIPIDEMIA: Chronic | Status: ACTIVE | Noted: 2023-09-25

## 2025-04-26 PROBLEM — E78.5 HYPERLIPIDEMIA: Chronic | Status: ACTIVE | Noted: 2023-09-25

## 2025-04-28 PROBLEM — I47.9 TACHYCARDIA, PAROXYSMAL: Status: ACTIVE | Noted: 2025-04-28

## 2025-05-16 ENCOUNTER — TELEPHONE (OUTPATIENT)
Dept: PHARMACY | Facility: CLINIC | Age: 60
End: 2025-05-16
Payer: COMMERCIAL

## 2025-05-16 NOTE — TELEPHONE ENCOUNTER
[facility]/Population Health Chart Review & Patient Outreach Details For Medication Adherence Project    Reason for Outreach Encounter: 3rd Party payor non-compliance report ([employer], [employer], [employer], etc)  2.  Patient Outreach Method: Reviewed patient chart   3.   Medication in question:    Hypertension Medications              atenoloL (TENORMIN) 25 MG tablet Take 1 tablet (25 mg total) by mouth once daily. For rapid heart rate.    losartan-hydrochlorothiazide 50-12.5 mg (HYZAAR) 50-12.5 mg per tablet Take 1 tablet by mouth every morning.                 losartan-hydrochlorothiazide   last filled  N/A      4.  Reviewed and or Updates Made To: Patient Chart  5. Outreach Outcomes and/or actions taken: Medication discontinued  Additional Notes:   Patient not taking. Reported on 4/28/2025

## 2025-06-24 ENCOUNTER — TELEPHONE (OUTPATIENT)
Dept: PHARMACY | Facility: CLINIC | Age: 60
End: 2025-06-24
Payer: COMMERCIAL

## 2025-06-25 NOTE — TELEPHONE ENCOUNTER
[facility]/Population Health Chart Review & Patient Outreach Details For Medication Adherence Project     1.Reason for Outreach Encounter: 3rd Party payor non-compliance report ([employer], [employer], [facility], etc)  2.  Patient Outreach Method: Reviewed Patient Chart  3.   Medication in question: Losartan/HCTZ  50-12.5mg  LAST FILLED: n/a    Hypertension Medications              atenoloL (TENORMIN) 25 MG tablet Take 1 tablet (25 mg total) by mouth once daily. For rapid heart rate.              4.  Reviewed and or Updates Made To: Patient Chart  5. Outreach Outcomes and/or actions taken: Medication discontinued     Additional Notes:

## 2025-07-21 ENCOUNTER — TELEPHONE (OUTPATIENT)
Dept: ENDOCRINOLOGY | Facility: CLINIC | Age: 60
End: 2025-07-21
Payer: COMMERCIAL

## 2025-07-21 ENCOUNTER — TELEPHONE (OUTPATIENT)
Dept: PHARMACY | Facility: CLINIC | Age: 60
End: 2025-07-21
Payer: COMMERCIAL

## 2025-07-21 NOTE — TELEPHONE ENCOUNTER
[facility]/Population Health Chart Review & Patient Outreach Details For Medication Adherence Project     1.Reason for Outreach Encounter: 3rd Party payor non-compliance report ([employer], [employer], [facility] [place], etc)  2.  Patient Outreach Method: Reviewed Patient Chart  3.   Medication in question: Losartan/HCTZ  LAST FILLED: n/a    Hypertension Medications              atenoloL (TENORMIN) 25 MG tablet Take 1 tablet (25 mg total) by mouth once daily. For rapid heart rate.              4.  Reviewed and or Updates Made To: Patient Chart  5. Outreach Outcomes and/or actions taken: Medication discontinued     Additional Notes:

## 2025-07-21 NOTE — TELEPHONE ENCOUNTER
Copied from CRM #[id number]. Topic: Appointments - Appointment Access  >> Jul 21, 2025  1:16 PM Suha wrote:  Type: Needs Medical Advice  Who Called:  Pt     Best Call Back Number: [phone]    Additional Information: Pt is needing to schedule a follow up appt for his hyperthyroidism, dr roman next available was too far out, asked to have the office call him